# Patient Record
Sex: MALE | URBAN - METROPOLITAN AREA
[De-identification: names, ages, dates, MRNs, and addresses within clinical notes are randomized per-mention and may not be internally consistent; named-entity substitution may affect disease eponyms.]

---

## 2018-09-12 ENCOUNTER — APPOINTMENT (RX ONLY)
Dept: URBAN - METROPOLITAN AREA CLINIC 57 | Facility: CLINIC | Age: 68
Setting detail: DERMATOLOGY
End: 2018-09-12

## 2018-09-12 DIAGNOSIS — L30.0 NUMMULAR DERMATITIS: ICD-10-CM

## 2018-09-12 DIAGNOSIS — L82.0 INFLAMED SEBORRHEIC KERATOSIS: ICD-10-CM

## 2018-09-12 DIAGNOSIS — L259 CONTACT DERMATITIS AND OTHER ECZEMA, UNSPECIFIED CAUSE: ICD-10-CM

## 2018-09-12 PROBLEM — L30.8 OTHER SPECIFIED DERMATITIS: Status: ACTIVE | Noted: 2018-09-12

## 2018-09-12 PROBLEM — I10 ESSENTIAL (PRIMARY) HYPERTENSION: Status: ACTIVE | Noted: 2018-09-12

## 2018-09-12 PROBLEM — J45.909 UNSPECIFIED ASTHMA, UNCOMPLICATED: Status: ACTIVE | Noted: 2018-09-12

## 2018-09-12 PROCEDURE — ? PRESCRIPTION

## 2018-09-12 PROCEDURE — 99203 OFFICE O/P NEW LOW 30 MIN: CPT

## 2018-09-12 PROCEDURE — ? TREATMENT REGIMEN

## 2018-09-12 PROCEDURE — ? COUNSELING

## 2018-09-12 PROCEDURE — ? OBSERVATION AND MEASURE

## 2018-09-12 RX ORDER — TRIAMCINOLONE ACETONIDE 1 MG/G
CREAM TOPICAL BID
Qty: 1 | Refills: 3 | Status: ERX | COMMUNITY
Start: 2018-09-12

## 2018-09-12 RX ORDER — MUPIROCIN 20 MG/G
OINTMENT TOPICAL QD
Qty: 1 | Refills: 4 | Status: ERX | COMMUNITY
Start: 2018-09-12

## 2018-09-12 RX ADMIN — MUPIROCIN: 20 OINTMENT TOPICAL at 00:00

## 2018-09-12 RX ADMIN — TRIAMCINOLONE ACETONIDE: 1 CREAM TOPICAL at 00:00

## 2018-09-12 ASSESSMENT — LOCATION DETAILED DESCRIPTION DERM
LOCATION DETAILED: RIGHT ANTERIOR DISTAL THIGH
LOCATION DETAILED: INFERIOR THORACIC SPINE
LOCATION DETAILED: LEFT DISTAL PRETIBIAL REGION
LOCATION DETAILED: RIGHT DORSAL FOOT
LOCATION DETAILED: RIGHT DISTAL PRETIBIAL REGION
LOCATION DETAILED: LEFT PROXIMAL DORSAL FOREARM
LOCATION DETAILED: RIGHT PROXIMAL DORSAL FOREARM
LOCATION DETAILED: LEFT DORSAL FOOT
LOCATION DETAILED: LEFT ANTERIOR PROXIMAL THIGH
LOCATION DETAILED: RIGHT ANTERIOR PROXIMAL THIGH

## 2018-09-12 ASSESSMENT — LOCATION ZONE DERM
LOCATION ZONE: LEG
LOCATION ZONE: TRUNK
LOCATION ZONE: ARM
LOCATION ZONE: FEET

## 2018-09-12 ASSESSMENT — LOCATION SIMPLE DESCRIPTION DERM
LOCATION SIMPLE: RIGHT THIGH
LOCATION SIMPLE: RIGHT FOOT
LOCATION SIMPLE: RIGHT FOREARM
LOCATION SIMPLE: LEFT FOOT
LOCATION SIMPLE: LEFT FOREARM
LOCATION SIMPLE: LEFT PRETIBIAL REGION
LOCATION SIMPLE: RIGHT PRETIBIAL REGION
LOCATION SIMPLE: LEFT THIGH
LOCATION SIMPLE: UPPER BACK

## 2018-09-12 NOTE — HPI: RASH (ECZEMA)
How Severe Is Your Eczema?: moderate
Is This A New Presentation, Or A Follow-Up?: Rash
Additional History: Patient had a toe infection about 2 months ago and was treated with doxycycline for about 1 month. 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 Patient mentioned during the time he started the antibiotics he started to develop an itchy rash all over the body with scabs on his feet.

## 2018-09-12 NOTE — PROCEDURE: TREATMENT REGIMEN
Detail Level: Zone
Initiate Treatment: TAC 0.1% cream apply to affected areas of the body bid 2 weeks on 1 week off. Repeat prn flares. Avoid face, groin, underarms\\nMupircoin oint bid on sores or scabs like on the feet\\nDry care instruction sheet given to patient.
Plan: Will request records from Dr. Nakul Espitia regarding infection and what abx was used to treat it.

## 2018-09-26 ENCOUNTER — APPOINTMENT (RX ONLY)
Dept: URBAN - METROPOLITAN AREA CLINIC 57 | Facility: CLINIC | Age: 68
Setting detail: DERMATOLOGY
End: 2018-09-26

## 2018-09-26 DIAGNOSIS — L259 CONTACT DERMATITIS AND OTHER ECZEMA, UNSPECIFIED CAUSE: ICD-10-CM | Status: IMPROVED

## 2018-09-26 DIAGNOSIS — L30.0 NUMMULAR DERMATITIS: ICD-10-CM

## 2018-09-26 PROBLEM — L85.3 XEROSIS CUTIS: Status: ACTIVE | Noted: 2018-09-26

## 2018-09-26 PROBLEM — L30.8 OTHER SPECIFIED DERMATITIS: Status: ACTIVE | Noted: 2018-09-26

## 2018-09-26 PROBLEM — L20.84 INTRINSIC (ALLERGIC) ECZEMA: Status: ACTIVE | Noted: 2018-09-26

## 2018-09-26 PROCEDURE — ? TREATMENT REGIMEN

## 2018-09-26 PROCEDURE — ? PRESCRIPTION

## 2018-09-26 PROCEDURE — 99213 OFFICE O/P EST LOW 20 MIN: CPT

## 2018-09-26 PROCEDURE — ? COUNSELING

## 2018-09-26 RX ORDER — SILVER SULFADIAZINE 10 MG/G
CREAM TOPICAL
Qty: 1 | Refills: 3 | Status: ERX | COMMUNITY
Start: 2018-09-26

## 2018-09-26 RX ADMIN — SILVER SULFADIAZINE: 10 CREAM TOPICAL at 00:00

## 2018-09-26 ASSESSMENT — LOCATION SIMPLE DESCRIPTION DERM
LOCATION SIMPLE: LEFT FOREARM
LOCATION SIMPLE: LEFT THIGH
LOCATION SIMPLE: RIGHT THIGH
LOCATION SIMPLE: LEFT PRETIBIAL REGION
LOCATION SIMPLE: LEFT FOOT
LOCATION SIMPLE: UPPER BACK
LOCATION SIMPLE: RIGHT FOREARM
LOCATION SIMPLE: RIGHT PRETIBIAL REGION
LOCATION SIMPLE: RIGHT FOOT

## 2018-09-26 ASSESSMENT — LOCATION DETAILED DESCRIPTION DERM
LOCATION DETAILED: RIGHT DORSAL FOOT
LOCATION DETAILED: RIGHT DISTAL PRETIBIAL REGION
LOCATION DETAILED: LEFT ANTERIOR PROXIMAL THIGH
LOCATION DETAILED: LEFT DISTAL PRETIBIAL REGION
LOCATION DETAILED: INFERIOR THORACIC SPINE
LOCATION DETAILED: RIGHT ANTERIOR PROXIMAL THIGH
LOCATION DETAILED: RIGHT PROXIMAL DORSAL FOREARM
LOCATION DETAILED: LEFT PROXIMAL DORSAL FOREARM
LOCATION DETAILED: LEFT DORSAL FOOT

## 2018-09-26 ASSESSMENT — LOCATION ZONE DERM
LOCATION ZONE: ARM
LOCATION ZONE: TRUNK
LOCATION ZONE: FEET
LOCATION ZONE: LEG

## 2018-09-26 NOTE — PROCEDURE: TREATMENT REGIMEN
Detail Level: Zone
Continue Regimen: TAC 0.1% cream apply to affected areas of the body bid 2 weeks on 1 week off. Repeat prn flares.\\nMupircoin oint bid on sores or scabs like on the feet
Plan: Will request records from Dr. Nakul Espitia regarding infection and what abx was used to treat it.
Initiate Treatment: Domebaro soaks bid followed by silvadene cream bid
Discontinue Regimen: Mupircoin oint bid on sores or scabs on feet
Continue Regimen: TAC 0.1% cream apply to affected areas of the body bid 2 weeks on 1 week off. Repeat prn flares.
Plan: Discussed with patient to possibly eval with vascular doctor to check for possible decreased blood flow to the right foot, a hematologist to check if there is any clotting and a neurologist regarding neuropathy in the foot.

## 2019-10-08 ENCOUNTER — APPOINTMENT (RX ONLY)
Dept: URBAN - METROPOLITAN AREA CLINIC 57 | Facility: CLINIC | Age: 69
Setting detail: DERMATOLOGY
End: 2019-10-08

## 2019-10-08 DIAGNOSIS — D69.2 OTHER NONTHROMBOCYTOPENIC PURPURA: ICD-10-CM

## 2019-10-08 DIAGNOSIS — T49.0X5 ADVERSE EFFECT OF LOCAL ANTIFUNGAL, ANTI-INFECTIVE AND ANTI-INFLAMMATORY DRUGS: ICD-10-CM

## 2019-10-08 DIAGNOSIS — L20.89 OTHER ATOPIC DERMATITIS: ICD-10-CM | Status: INADEQUATELY CONTROLLED

## 2019-10-08 DIAGNOSIS — L82.0 INFLAMED SEBORRHEIC KERATOSIS: ICD-10-CM | Status: RESOLVING

## 2019-10-08 PROBLEM — T49.0X5A ADVERSE EFFECT OF LOCAL ANTIFUNGAL, ANTI-INFECTIVE AND ANTI-INFLAMMATORY DRUGS, INITIAL ENCOUNTER: Status: ACTIVE | Noted: 2019-10-08

## 2019-10-08 PROCEDURE — ? TREATMENT REGIMEN

## 2019-10-08 PROCEDURE — 99214 OFFICE O/P EST MOD 30 MIN: CPT

## 2019-10-08 PROCEDURE — ? COUNSELING

## 2019-10-08 PROCEDURE — ? PRESCRIPTION

## 2019-10-08 RX ORDER — HALOBETASOL PROPIONATE 0.1 MG/G
LOTION TOPICAL
Qty: 1 | Refills: 3 | Status: ERX | COMMUNITY
Start: 2019-10-08

## 2019-10-08 RX ADMIN — HALOBETASOL PROPIONATE: 0.1 LOTION TOPICAL at 17:20

## 2019-10-08 ASSESSMENT — LOCATION DETAILED DESCRIPTION DERM
LOCATION DETAILED: INFERIOR THORACIC SPINE
LOCATION DETAILED: RIGHT CENTRAL FRONTAL SCALP
LOCATION DETAILED: RIGHT PROXIMAL POSTERIOR UPPER ARM
LOCATION DETAILED: RIGHT DISTAL POSTERIOR UPPER ARM
LOCATION DETAILED: LEFT PROXIMAL DORSAL FOREARM
LOCATION DETAILED: RIGHT PROXIMAL DORSAL FOREARM
LOCATION DETAILED: RIGHT DISTAL POSTERIOR THIGH
LOCATION DETAILED: LEFT DISTAL POSTERIOR UPPER ARM
LOCATION DETAILED: LEFT DISTAL POSTERIOR THIGH

## 2019-10-08 ASSESSMENT — LOCATION SIMPLE DESCRIPTION DERM
LOCATION SIMPLE: RIGHT FOREARM
LOCATION SIMPLE: LEFT FOREARM
LOCATION SIMPLE: UPPER BACK
LOCATION SIMPLE: SCALP
LOCATION SIMPLE: RIGHT UPPER ARM
LOCATION SIMPLE: LEFT POSTERIOR THIGH
LOCATION SIMPLE: LEFT UPPER ARM
LOCATION SIMPLE: RIGHT POSTERIOR THIGH

## 2019-10-08 ASSESSMENT — LOCATION ZONE DERM
LOCATION ZONE: SCALP
LOCATION ZONE: ARM
LOCATION ZONE: TRUNK
LOCATION ZONE: LEG

## 2019-10-08 NOTE — PROCEDURE: MIPS QUALITY
Quality 111:Pneumonia Vaccination Status For Older Adults: Pneumococcal Vaccination not Administered or Previously Received, Reason not Otherwise Specified
Quality 110: Preventive Care And Screening: Influenza Immunization: Influenza Immunization Administered during Influenza season
Detail Level: Detailed
Quality 131: Pain Assessment And Follow-Up: Pain assessment using a standardized tool is documented as negative, no follow-up plan required
Quality 130: Documentation Of Current Medications In The Medical Record: Current Medications Documented
Quality 226: Preventive Care And Screening: Tobacco Use: Screening And Cessation Intervention: Patient screened for tobacco use and is an ex/non-smoker

## 2019-10-08 NOTE — PROCEDURE: TREATMENT REGIMEN
Discontinue Regimen: Clobetasol 0.05% cream (rx from PCP)
Initiate Treatment: Bryhali 0.01% lotion QD PRN flares
Plan: Discussed oral steroid vs. NBUVB vs. cyclosporine vs. MTX vs. Dupixent for treatment to consider in the future due to failed aggressive topical steroid treatment. Patient defers oral steroid due to depression with past usage of oral steroid. Patient defers NBUVB bc of patient’s work schedule. Will request most recent blood work from PCP. Will check coverage for Dupixent pending labs WNL. If Dupixent not covered, will consider MTX for treatment
Detail Level: Generalized
Samples Given: Eucerin cream BID
Otc Regimen: shower with Dove soap with lukewarm water QD

## 2019-10-08 NOTE — HPI: RASH
What Type Of Note Output Would You Prefer (Optional)?: Standard Output
How Severe Is Your Rash?: moderate
Is This A New Presentation, Or A Follow-Up?: Rash
Additional History: Patient has been using clobetasol cream QD rx from PCP for treatment of nummular eczema.

## 2019-10-14 ENCOUNTER — RX ONLY (OUTPATIENT)
Age: 69
Setting detail: RX ONLY
End: 2019-10-14

## 2019-10-14 RX ORDER — PIMECROLIMUS 10 MG/G
CREAM TOPICAL
Qty: 1 | Refills: 0 | Status: ERX | COMMUNITY
Start: 2019-10-14